# Patient Record
Sex: MALE | Race: WHITE | NOT HISPANIC OR LATINO | Employment: OTHER | URBAN - METROPOLITAN AREA
[De-identification: names, ages, dates, MRNs, and addresses within clinical notes are randomized per-mention and may not be internally consistent; named-entity substitution may affect disease eponyms.]

---

## 2019-03-20 ENCOUNTER — TELEPHONE (OUTPATIENT)
Dept: VASCULAR SURGERY | Facility: CLINIC | Age: 74
End: 2019-03-20

## 2019-03-20 NOTE — TELEPHONE ENCOUNTER
----- Message from Kiran Cast sent at 3/20/2019  4:43 PM CDT -----  Contact: Lisa with [facility]    Advised ptnt needs an appnt as soon as possible for CABG he is being discharged today. Appnt scheduled for 04-09-10.  Please call his family [phone] or [phone]

## 2019-03-25 ENCOUNTER — OFFICE VISIT (OUTPATIENT)
Dept: VASCULAR SURGERY | Facility: CLINIC | Age: 74
End: 2019-03-25
Payer: MEDICARE

## 2019-03-25 VITALS
HEART RATE: 89 BPM | HEIGHT: 72 IN | BODY MASS INDEX: 26.55 KG/M2 | RESPIRATION RATE: 18 BRPM | DIASTOLIC BLOOD PRESSURE: 79 MMHG | WEIGHT: 196 LBS | SYSTOLIC BLOOD PRESSURE: 161 MMHG

## 2019-03-25 DIAGNOSIS — Z95.5 HISTORY OF HEART ARTERY STENT: ICD-10-CM

## 2019-03-25 DIAGNOSIS — I73.9 PERIPHERAL VASCULAR DISEASE WITH CLAUDICATION: ICD-10-CM

## 2019-03-25 DIAGNOSIS — I25.111 CORONARY ARTERY DISEASE INVOLVING NATIVE CORONARY ARTERY OF NATIVE HEART WITH ANGINA PECTORIS WITH DOCUMENTED SPASM: ICD-10-CM

## 2019-03-25 DIAGNOSIS — I20.89 EXERTIONAL ANGINA: ICD-10-CM

## 2019-03-25 PROCEDURE — 99999 PR PBB SHADOW E&M-EST. PATIENT-LVL III: CPT | Mod: PBBFAC,,, | Performed by: THORACIC SURGERY (CARDIOTHORACIC VASCULAR SURGERY)

## 2019-03-25 PROCEDURE — 99999 PR PBB SHADOW E&M-EST. PATIENT-LVL III: ICD-10-PCS | Mod: PBBFAC,,, | Performed by: THORACIC SURGERY (CARDIOTHORACIC VASCULAR SURGERY)

## 2019-03-25 PROCEDURE — 1101F PT FALLS ASSESS-DOCD LE1/YR: CPT | Mod: CPTII,S$GLB,, | Performed by: THORACIC SURGERY (CARDIOTHORACIC VASCULAR SURGERY)

## 2019-03-25 PROCEDURE — 99205 PR OFFICE/OUTPT VISIT, NEW, LEVL V, 60-74 MIN: ICD-10-PCS | Mod: S$GLB,,, | Performed by: THORACIC SURGERY (CARDIOTHORACIC VASCULAR SURGERY)

## 2019-03-25 PROCEDURE — 1101F PR PT FALLS ASSESS DOC 0-1 FALLS W/OUT INJ PAST YR: ICD-10-PCS | Mod: CPTII,S$GLB,, | Performed by: THORACIC SURGERY (CARDIOTHORACIC VASCULAR SURGERY)

## 2019-03-25 PROCEDURE — 99205 OFFICE O/P NEW HI 60 MIN: CPT | Mod: S$GLB,,, | Performed by: THORACIC SURGERY (CARDIOTHORACIC VASCULAR SURGERY)

## 2019-03-25 RX ORDER — PAROXETINE HYDROCHLORIDE 20 MG/1
20 TABLET, FILM COATED ORAL EVERY MORNING
COMMUNITY

## 2019-03-25 RX ORDER — AMLODIPINE BESYLATE 10 MG/1
10 TABLET ORAL DAILY
COMMUNITY
End: 2019-07-09

## 2019-03-25 RX ORDER — CARVEDILOL 12.5 MG/1
12.5 TABLET ORAL 2 TIMES DAILY WITH MEALS
COMMUNITY
End: 2020-09-29

## 2019-03-25 RX ORDER — ALBUTEROL SULFATE 1.25 MG/3ML
1.25 SOLUTION RESPIRATORY (INHALATION) EVERY 6 HOURS PRN
COMMUNITY
End: 2019-07-09

## 2019-03-25 RX ORDER — LOSARTAN POTASSIUM 100 MG/1
100 TABLET ORAL DAILY
COMMUNITY
End: 2019-04-23

## 2019-03-25 NOTE — PROGRESS NOTES
CHIEF COMPLAINT:   Chief Complaint   Patient presents with    Coronary Artery Disease       REFERRING PROVIDER: Arabella Willams MD    Reason for consult:  Evaluation of coronary artery disease    Subjective:     Patient is a 73 y.o. male  With a history of coronary artery stents approximately 7 years ago.  He has begun to develop an indigestion like pain similar to his symptoms prior to his previous stents.  He has been taking nitroglycerin approximately every other day.  His pains occur with exertion.  They do not occur at rest.  He has undergone cardiac catheterization revealing multivessel coronary artery disease.  He quit tobacco use 6 weeks ago.  He does not take statins because all of them so far have caused syncope.     Patient Active Problem List    Diagnosis Date Noted    Coronary artery disease involving native coronary artery of native heart with angina pectoris with documented spasm 03/25/2019    Exertional angina 03/25/2019    Peripheral vascular disease with claudication 03/25/2019    History of heart artery stent 03/25/2019     Past Medical History:   Diagnosis Date    AAA (abdominal aortic aneurysm)     3.7 cm    Carotid artery occlusion     Bilateral carotid stenosis    Coronary artery disease     Coronary artery disease involving native coronary artery of native heart with angina pectoris with documented spasm 3/25/2019    Exertional angina 3/25/2019    History of heart artery stent 3/25/2019    Hypertension     National Institutes of Health (NIH) Stroke Scale sensory score 2, severe to total sensory loss; patient is not aware of being touched in the face, arm, and leg     Left arm    Peripheral vascular disease with claudication       Past Surgical History:   Procedure Laterality Date    CAROTID ARTERY Stent Bilateral     CORONARY ANGIOPLASTY  03/20/2019    CORONARY STENT PLACEMENT  2012      Medication List with Changes/Refills   Current Medications    ALBUTEROL (ACCUNEB) 1.25  MG/3 ML NEBU    Take 1.25 mg by nebulization every 6 (six) hours as needed. Rescue    AMLODIPINE (NORVASC) 10 MG TABLET    Take 10 mg by mouth once daily.    CARVEDILOL (COREG) 12.5 MG TABLET    Take 12.5 mg by mouth 2 (two) times daily with meals.    LOSARTAN (COZAAR) 100 MG TABLET    Take 100 mg by mouth once daily.    PAROXETINE (PAXIL) 20 MG TABLET    Take 20 mg by mouth every morning.     Review of patient's allergies indicates:   Allergen Reactions    Penicillin       Social History     Tobacco Use    Smoking status: Former Smoker     Packs/day: 1.50     Years: 50.00     Pack years: 75.00     Types: Cigarettes     Last attempt to quit: 2019     Years since quittin.1    Smokeless tobacco: Never Used   Substance Use Topics    Alcohol use: Not Currently      FAMILY HISTORY:  Father with coronary artery disease       Review of Systems   general:  No fevers, chills, night sweats, weight changes.    HEENT: No new visual field changes or hoarseness.    Neck:  No complaints.    Respiratory: No shortness of breath, cough, hemoptysis.   Cardiac:  Positive chest pain with minimal exertion.    GI: No constipation or melena.    : No dysuria or frequency.    Musculoskeletal:  No significant arthralgias.    Skin:  No rash.    Neurologic:  Patient has sensory deficits throughout the.    Vascular:  Bilateral lower extremity claudication worse in the left calf than the right .          Objective: physical exam     Vitals:    19 1409   BP: (!) 161/79   Pulse: 89   Resp: 18   Weight: 88.9 kg (196 lb)   Height: 6' (1.829 m)   PainSc: 0-No pain       General: Well-nourished, well-developed man in no obvious distress.    HEENT: Normocephalic, atraumatic.  There is good facial symmetry.    Neck: Trachea is midline.  There are no carotid bruits.    Chest:  No chest wall abnormalities.    Lungs:  Clear bilaterally.  Breath sounds are distant.    Heart:  Regular rate and rhythm.  No rubs or murmurs.    Abdomen: Soft.   No hepatomegaly.  Bowel sounds are normal.    Extremities:  No cyanosis, clubbing, edema.    Vascular:  2+ femoral pulses bilaterally.  Pedal pulses are not palpable.  Radial pulses are palpable.    Skin:  No rash.    Neurologic:  Alert and oriented x4 with no motor deficits.    Data Review:     CARDIAC CATH:   [facility] 03/20/2019:   I have reviewed the study personally.    Left ventricular ejection fraction 60%.    Left main is normal.    Left anterior descending is occluded proximally within the stented area and reconstitutes from right to left collaterals.  A diagonal branch also has been stented previously and fills through right to left collaterals.  Ramus intermedius is without significant disease disease.  There stents in the mid to distal circumflex including the circumflex and a large obtuse marginal branch.  The large, 3rd, obtuse marginal branch has a 90% InStent stenosis.  The continuing circumflex has stenotic disease and then bifurcates into small vessels beyond that.  Right coronary artery without significant disease.          Assessment:     Rahul was seen today for coronary artery disease.    Diagnoses and all orders for this visit:    Coronary artery disease involving native coronary artery of native heart with angina pectoris with documented spasm    Exertional angina    Peripheral vascular disease with claudication    History of heart artery stent        Plan:       I recommend that the patient undergo surgical coronary revascularization.  I have discussed the benefits as well as risk of surgery including death, bleeding, infection, stroke, heart failure, lung failure, kidney failure, need for blood transfusion, arrhythmia is.  He gives informed consent to proceed.

## 2019-04-03 ENCOUNTER — OUTSIDE PLACE OF SERVICE (OUTPATIENT)
Dept: ADMINISTRATIVE | Facility: OTHER | Age: 74
End: 2019-04-03

## 2019-04-03 ENCOUNTER — OUTSIDE PLACE OF SERVICE (OUTPATIENT)
Dept: ADMINISTRATIVE | Facility: OTHER | Age: 74
End: 2019-04-03
Payer: MEDICARE

## 2019-04-03 PROCEDURE — 34714 OPN FEM ART EXPOS CNDT CRTJ: CPT | Mod: RT,,, | Performed by: THORACIC SURGERY (CARDIOTHORACIC VASCULAR SURGERY)

## 2019-04-03 PROCEDURE — 33533 CABG ARTERIAL SINGLE: CPT | Mod: AS,,, | Performed by: NURSE PRACTITIONER

## 2019-04-03 PROCEDURE — 33533 PR CABG, ARTERIAL, SINGLE: ICD-10-PCS | Mod: ,,, | Performed by: THORACIC SURGERY (CARDIOTHORACIC VASCULAR SURGERY)

## 2019-04-03 PROCEDURE — 33533 CABG ARTERIAL SINGLE: CPT | Mod: ,,, | Performed by: THORACIC SURGERY (CARDIOTHORACIC VASCULAR SURGERY)

## 2019-04-03 PROCEDURE — 33508 PR ENDOSCOPY W/VIDEO-ASST VEIN HARVEST,CABG: ICD-10-PCS | Mod: ,,, | Performed by: THORACIC SURGERY (CARDIOTHORACIC VASCULAR SURGERY)

## 2019-04-03 PROCEDURE — 33517 CABG ARTERY-VEIN SINGLE: CPT | Mod: AS,,, | Performed by: NURSE PRACTITIONER

## 2019-04-03 PROCEDURE — 33508 ENDOSCOPIC VEIN HARVEST: CPT | Mod: ,,, | Performed by: THORACIC SURGERY (CARDIOTHORACIC VASCULAR SURGERY)

## 2019-04-03 PROCEDURE — 33517 PR CABG, ARTERY-VEIN, SINGLE: ICD-10-PCS | Mod: AS,,, | Performed by: NURSE PRACTITIONER

## 2019-04-03 PROCEDURE — 34714 OPN FEM ART EXPOS CNDT CRTJ: CPT | Mod: AS,RT,, | Performed by: NURSE PRACTITIONER

## 2019-04-03 PROCEDURE — 34714 PR EXPOSURE, FEM ART, W/CNDT, DLVR ENDOVASC PROSTH, OPEN: ICD-10-PCS | Mod: RT,,, | Performed by: THORACIC SURGERY (CARDIOTHORACIC VASCULAR SURGERY)

## 2019-04-03 PROCEDURE — 33517 PR CABG, ARTERY-VEIN, SINGLE: ICD-10-PCS | Mod: ,,, | Performed by: THORACIC SURGERY (CARDIOTHORACIC VASCULAR SURGERY)

## 2019-04-03 PROCEDURE — 33533 PR CABG, ARTERIAL, SINGLE: ICD-10-PCS | Mod: AS,,, | Performed by: NURSE PRACTITIONER

## 2019-04-03 PROCEDURE — 33517 CABG ARTERY-VEIN SINGLE: CPT | Mod: ,,, | Performed by: THORACIC SURGERY (CARDIOTHORACIC VASCULAR SURGERY)

## 2019-04-03 PROCEDURE — 34714 PR EXPOSURE, FEM ART, W/CNDT, DLVR ENDOVASC PROSTH, OPEN: ICD-10-PCS | Mod: AS,RT,, | Performed by: NURSE PRACTITIONER

## 2019-04-11 ENCOUNTER — TELEPHONE (OUTPATIENT)
Dept: VASCULAR SURGERY | Facility: CLINIC | Age: 74
End: 2019-04-11

## 2019-04-11 NOTE — TELEPHONE ENCOUNTER
----- Message from Eliana Blanco sent at 4/11/2019 12:38 PM CDT -----  Contact: Amanda Osborne (Spouse)  Type: Needs Medical Advice    Who Called:  Amanda Osborne (Spouse)  Best Call Back Number: [phone]  Additional Information: would like for the nurse to give her a call back. She has a few questions

## 2019-04-11 NOTE — TELEPHONE ENCOUNTER
Was calling because he could not get into cardiac rehab until June. I told her she needed to contact Dr Willams to see when he wants him to start. She also refused appointment to see Dr Harris on 4/30 because she is a hairdresser and she works on Tuesday and cannot bring him. I tried to explain to her that he is only in on Tuesday, that he is in surgery the other days. She said he saw us on Monday last time. Yes he did but it was an exception for him to see him that day. I will leave him on the schedule for 4/30 and will await her return call.

## 2019-04-17 ENCOUNTER — TELEPHONE (OUTPATIENT)
Dept: VASCULAR SURGERY | Facility: CLINIC | Age: 74
End: 2019-04-17

## 2019-04-17 NOTE — TELEPHONE ENCOUNTER
Patient wife Amanda was calling to let us know that Rahul is having SOB and no energy. He does not want to walk or do any of his exercises. I let her know that she should contact his cardiologist Dr Willams and let him know. She states she has already called them today and they have not returned her call. They gave him an appointment in May. I called and left a message at Dr Willams's for them to get in touch with her. His follow-up appointment with Dr Harris is on 4/30.

## 2019-04-17 NOTE — TELEPHONE ENCOUNTER
----- Message from Milvia Tucker sent at 4/17/2019  9:23 AM CDT -----  Contact: Pt/wife  Please give pt wife a call at [phone] regarding some concerns she has about the pt

## 2019-04-23 ENCOUNTER — OFFICE VISIT (OUTPATIENT)
Dept: VASCULAR SURGERY | Facility: CLINIC | Age: 74
End: 2019-04-23
Payer: MEDICARE

## 2019-04-23 VITALS
DIASTOLIC BLOOD PRESSURE: 77 MMHG | BODY MASS INDEX: 24.63 KG/M2 | HEART RATE: 108 BPM | HEIGHT: 72 IN | WEIGHT: 181.88 LBS | SYSTOLIC BLOOD PRESSURE: 143 MMHG

## 2019-04-23 DIAGNOSIS — Z95.1 S/P CABG X 2: Primary | ICD-10-CM

## 2019-04-23 DIAGNOSIS — S54.02XA NEURAPRAXIA OF LEFT ULNAR NERVE, INITIAL ENCOUNTER: ICD-10-CM

## 2019-04-23 PROCEDURE — 99024 PR POST-OP FOLLOW-UP VISIT: ICD-10-PCS | Mod: S$GLB,,, | Performed by: THORACIC SURGERY (CARDIOTHORACIC VASCULAR SURGERY)

## 2019-04-23 PROCEDURE — 99999 PR PBB SHADOW E&M-EST. PATIENT-LVL IV: CPT | Mod: PBBFAC,,, | Performed by: THORACIC SURGERY (CARDIOTHORACIC VASCULAR SURGERY)

## 2019-04-23 PROCEDURE — 99024 POSTOP FOLLOW-UP VISIT: CPT | Mod: S$GLB,,, | Performed by: THORACIC SURGERY (CARDIOTHORACIC VASCULAR SURGERY)

## 2019-04-23 PROCEDURE — 99999 PR PBB SHADOW E&M-EST. PATIENT-LVL IV: ICD-10-PCS | Mod: PBBFAC,,, | Performed by: THORACIC SURGERY (CARDIOTHORACIC VASCULAR SURGERY)

## 2019-04-23 RX ORDER — FERROUS GLUCONATE 324(38)MG
TABLET ORAL
Refills: 0 | COMMUNITY
Start: 2019-04-08 | End: 2019-07-09

## 2019-04-23 RX ORDER — ASPIRIN 325 MG
TABLET ORAL
Refills: 3 | COMMUNITY
Start: 2019-04-08 | End: 2020-09-29

## 2019-04-23 NOTE — PROGRESS NOTES
Subjective:       Patient ID: Rahul Osborne is a 73 y.o. male patient of  Dr. Harris who underwent coronary artery bypass × 2 (left internal thoracic artery to left anterior descending, reverse saphenous vein graft from LIMA graft to obtuse marginal) (procedure done as empty beating heart on pump surgery through right femoral and right atrial cannulation) on 4/3/19 at [facility]. He called the office yesterday complaining of redness around the distal part of his incision. He is here today for evaluation. He states that he has had two falls and developed some bruising in the lower part of his chest with redness surrounding the distal third of the incision about 3 days ago. He complains of some tenderness at this site. He saw Dr. Willams yesterday. He was started on Doxycycline 100mg twice daily for treatment of cellutis. He states that he still has some left hand weakness since the surgery. He also complains of occasional dizziness and light headedness.        Objective:     GEN: pleasant white male in no acute distress  HEART: RRR. No murmur  CHEST: Lungs clear in all fields. Sternal incision is well approximated. Ecchymosis across lower thorax at the level of the diaphragm with a large area of dark erythema, which blanches. Mild tenderness on palpation. Sternum is stable.  EXTREMITIES: Left leg: Incision to medial thigh SVG harvest site is well approximated without erythema or drainage. No lower extremity edema. Right groin cannulation site is well healed.                            Left hand:  strength - Function of intrinsic hand muscles are absent. 1+/4+ extension strength at the wrist. Biceps/triceps strength is equal bilaterally. Altered sensation along the ulnar border of the left arm which was present prior to the surgery.      Assessment:       1. S/P CABG x 2  2. Neurapraxia left ulnar nerve    Plan:       1. Continue Doxycycline   2. PT and OT for left hand. Orders to [facility]  3. Consult Neurology for left hand neurapraxia  4.  Return to see  in one week.    * Patient examined by Dr. Harris

## 2019-04-30 ENCOUNTER — OFFICE VISIT (OUTPATIENT)
Dept: VASCULAR SURGERY | Facility: CLINIC | Age: 74
End: 2019-04-30
Payer: MEDICARE

## 2019-04-30 VITALS
BODY MASS INDEX: 24.22 KG/M2 | HEART RATE: 90 BPM | DIASTOLIC BLOOD PRESSURE: 77 MMHG | HEIGHT: 72 IN | SYSTOLIC BLOOD PRESSURE: 126 MMHG | WEIGHT: 178.81 LBS

## 2019-04-30 DIAGNOSIS — Z95.1 S/P CABG (CORONARY ARTERY BYPASS GRAFT): ICD-10-CM

## 2019-04-30 DIAGNOSIS — S54.02XD NEURAPRAXIA OF LEFT ULNAR NERVE, SUBSEQUENT ENCOUNTER: ICD-10-CM

## 2019-04-30 PROBLEM — S54.02XA: Status: ACTIVE | Noted: 2019-04-30

## 2019-04-30 PROCEDURE — 99024 PR POST-OP FOLLOW-UP VISIT: ICD-10-PCS | Mod: S$GLB,,, | Performed by: THORACIC SURGERY (CARDIOTHORACIC VASCULAR SURGERY)

## 2019-04-30 PROCEDURE — 99024 POSTOP FOLLOW-UP VISIT: CPT | Mod: S$GLB,,, | Performed by: THORACIC SURGERY (CARDIOTHORACIC VASCULAR SURGERY)

## 2019-04-30 PROCEDURE — 99999 PR PBB SHADOW E&M-EST. PATIENT-LVL III: ICD-10-PCS | Mod: PBBFAC,,, | Performed by: THORACIC SURGERY (CARDIOTHORACIC VASCULAR SURGERY)

## 2019-04-30 PROCEDURE — 99999 PR PBB SHADOW E&M-EST. PATIENT-LVL III: CPT | Mod: PBBFAC,,, | Performed by: THORACIC SURGERY (CARDIOTHORACIC VASCULAR SURGERY)

## 2019-04-30 RX ORDER — DOXYCYCLINE 100 MG/1
CAPSULE ORAL
Refills: 20 | COMMUNITY
Start: 2019-04-27 | End: 2019-07-09

## 2019-04-30 NOTE — PROGRESS NOTES
Subjective:       Rahul Osborne presents to the clinic after undergoing coronary artery bypass x2 on 04/03/2019 and [facility].  Postoperatively was noted to have weakness of the intrinsic muscles of the left hand.  This still has not improved.  He was scheduled for occupational therapy, but he declined initial visit by home health occupational therapy.  He was also schedule for neurology evaluation, but his wife was not able to make the appointment on short notice.  He remains with left hand weakness.  He is also developed episodes of dizziness to the point of near syncope which usually occur with ambulation.       Objective:      /77   Pulse 90   Ht 6' (1.829 m)   Wt 81.1 kg (178 lb 12.8 oz)   BMI 24.25 kg/m²     Objective  General:  He appears well.  Chest:  Sternum is healed.  Sternotomy incisions are healing well.  The purple/red area around the lowest part of his incision is resolving.  Heart:  Regular rate and rhythm.  Lungs:  Clear bilaterally       Assessment:      Postoperative course complicated by neurapraxia in the left ulnar nerve distribution distally and now by presyncope.  Patient also does note that he has had some other falls earlier 1 of which caused him to strike his chest.        Plan:      1. Continue any current medications.  2. Wound care discussed.  3.  He is encouraged to reschedule his neurology appointment and to contact the home occupational therapist to immediately initiate occupational therapy for his left hand/arm.  4. Follow up: 4 weeks

## 2019-05-01 ENCOUNTER — TELEPHONE (OUTPATIENT)
Dept: VASCULAR SURGERY | Facility: CLINIC | Age: 74
End: 2019-05-01

## 2019-05-01 NOTE — TELEPHONE ENCOUNTER
Called Zak.Dr Willams to get the patient scheduled for Tilt Table Test since [facility] does not do them. Patient has to be sent to Dr Gaming @ [facility]

## 2019-05-03 ENCOUNTER — TELEPHONE (OUTPATIENT)
Dept: VASCULAR SURGERY | Facility: CLINIC | Age: 74
End: 2019-05-03

## 2019-05-03 NOTE — TELEPHONE ENCOUNTER
Debbie  nurse was calling to say that patients BP was 107/71 and 83/49. She was asked to contact patients cardiologist Dr Willams. His nurse Zak was to contact patient with appointment for a tilt table test, but patient states this has not been scheduled yet. She will contact his off with the BP concerns and the tilt table test

## 2019-05-03 NOTE — TELEPHONE ENCOUNTER
----- Message from Elian Soto sent at 5/3/2019  1:01 PM CDT -----  Type: Needs Medical Advice    Who Called:  Debbie Regan/[employer]  Best Call Back Number: [phone]  Additional Information: Patient seems to be getting orthostatic hypertension with any activity

## 2019-05-17 ENCOUNTER — TELEPHONE (OUTPATIENT)
Dept: VASCULAR SURGERY | Facility: CLINIC | Age: 74
End: 2019-05-17

## 2019-05-17 NOTE — TELEPHONE ENCOUNTER
----- Message from Ada Ferguson sent at 5/17/2019  2:48 PM CDT -----  Contact: Willy Vaz-  [facility]        [phone]  Would like to have orders for outpatient occupational therapy. Please call back at [phone].  Md Sumi

## 2019-05-28 ENCOUNTER — OFFICE VISIT (OUTPATIENT)
Dept: VASCULAR SURGERY | Facility: CLINIC | Age: 74
End: 2019-05-28
Payer: MEDICARE

## 2019-05-28 VITALS
HEIGHT: 72 IN | RESPIRATION RATE: 20 BRPM | SYSTOLIC BLOOD PRESSURE: 162 MMHG | HEART RATE: 112 BPM | BODY MASS INDEX: 24.24 KG/M2 | WEIGHT: 179 LBS | DIASTOLIC BLOOD PRESSURE: 102 MMHG

## 2019-05-28 DIAGNOSIS — S54.02XD NEURAPRAXIA OF LEFT ULNAR NERVE, SUBSEQUENT ENCOUNTER: ICD-10-CM

## 2019-05-28 DIAGNOSIS — Z95.1 S/P CABG (CORONARY ARTERY BYPASS GRAFT): Primary | ICD-10-CM

## 2019-05-28 PROCEDURE — 99999 PR PBB SHADOW E&M-EST. PATIENT-LVL III: CPT | Mod: PBBFAC,,, | Performed by: THORACIC SURGERY (CARDIOTHORACIC VASCULAR SURGERY)

## 2019-05-28 PROCEDURE — 99024 PR POST-OP FOLLOW-UP VISIT: ICD-10-PCS | Mod: S$GLB,,, | Performed by: THORACIC SURGERY (CARDIOTHORACIC VASCULAR SURGERY)

## 2019-05-28 PROCEDURE — 99999 PR PBB SHADOW E&M-EST. PATIENT-LVL III: ICD-10-PCS | Mod: PBBFAC,,, | Performed by: THORACIC SURGERY (CARDIOTHORACIC VASCULAR SURGERY)

## 2019-05-28 PROCEDURE — 99024 POSTOP FOLLOW-UP VISIT: CPT | Mod: S$GLB,,, | Performed by: THORACIC SURGERY (CARDIOTHORACIC VASCULAR SURGERY)

## 2019-05-28 NOTE — PROGRESS NOTES
Subjective:       Rahul Osborne presents to the clinic  After undergoing coronary artery bypass x2 on 04/03/2019 at [facility].  He continues with intrinsic muscle weakness in the left hand.  He has only had 1 treatment through Occupational therapy who he states came to his home but did not follow up.  He has started with physical therapy.  He has noted occasional slight popping at the very lowest right edge of his sternotomy incision.       Objective:      BP (!) 162/102   Pulse (!) 112   Resp 20   Ht 6' (1.829 m)   Wt 81.2 kg (179 lb)   BMI 24.28 kg/m²     Objective    General:  He appears well.    Chest:  Sternum is completely stable through all maneuvers that I perform upon it.  There is still some slight erythema and skin thickening in the low as portion around his sternotomy incision but the area has markedly decreased in circumference and shows no sign of active infection.    Lungs:  Clear bilaterally.    Heart: Regular rate and rhythm.    Neurologic:  Remains with intrinsic muscle weakness in the left hand       Assessment:      Doing well postoperatively from a surgical an incisional standpoint.   remains with intrinsic muscle weakness in left hand       Plan:      1. Continue any current medications.  2.  Continue occupational therapy.  Patient will now proceed through a different company other than [facility].  3. Pt is to increase activities as tolerated.  4. Follow up: 6 weeks

## 2019-05-29 ENCOUNTER — TELEPHONE (OUTPATIENT)
Dept: VASCULAR SURGERY | Facility: CLINIC | Age: 74
End: 2019-05-29

## 2019-05-29 NOTE — TELEPHONE ENCOUNTER
----- Message from Darlyn Morin sent at 5/29/2019  1:13 PM CDT -----  Please call Miss Hernandes ...[phone] about the physical therapy orders

## 2019-05-29 NOTE — TELEPHONE ENCOUNTER
----- Message from Darlyn Morin sent at 5/29/2019 12:32 PM CDT -----  Type: Needs Medical Advice    Who Called:   Noelle / [facility] [phone] fax  [phone]   Symptoms (please be specific):  Request a phys therapy for generalized  weakness   How long has patient had these symptoms:  They did receive a new order but not was correct   Pharmacy name and phone #:     Best Call Back Number:    Additional Information:

## 2019-05-29 NOTE — TELEPHONE ENCOUNTER
----- Message from Elian Soto sent at 5/29/2019  8:20 AM CDT -----  Type: Needs Medical Advice    Who Called:  [facility]  Best Call Back Number: [phone]  Additional Information: Need order for Physical Therapy  For strengthening - please fax to [phone]

## 2019-05-30 NOTE — TELEPHONE ENCOUNTER
I spoke to Lauren in Dr Willams's office and she will contact patient to get enrolled in cardiac rehab. Greta notified

## 2019-06-20 ENCOUNTER — TELEPHONE (OUTPATIENT)
Dept: VASCULAR SURGERY | Facility: CLINIC | Age: 74
End: 2019-06-20

## 2019-06-20 NOTE — TELEPHONE ENCOUNTER
----- Message from Chayo Castillo sent at 6/20/2019  1:12 PM CDT -----  Contact: Patient  Type: Needs Medical Advice    Who Called:  Patient  Symptoms (please be specific): surgical clamps above left hip/ painful/ red and white blood cells and platelets are extremely low  Best Call Back Number:   Additional Information: Calling to speak with the Nurse about the symptoms. Please advise

## 2019-06-20 NOTE — TELEPHONE ENCOUNTER
----- Message from Mei Kraus sent at 6/20/2019  3:10 PM CDT -----  Type:  Patient Returning Call    Who Called:  Patient  Who Left Message for Patient:  n/a  Does the patient know what this is regarding?:  no  Best Call Back Number:  [phone] (home)

## 2019-07-03 NOTE — TELEPHONE ENCOUNTER
Patient did not pursue getting hip xray report faxed to us. He will bring disc and report to appointment on 7/9/19. Our new address was given also

## 2019-07-09 ENCOUNTER — OFFICE VISIT (OUTPATIENT)
Dept: CARDIAC SURGERY | Facility: CLINIC | Age: 74
End: 2019-07-09
Payer: MEDICARE

## 2019-07-09 VITALS
HEART RATE: 123 BPM | WEIGHT: 180.63 LBS | DIASTOLIC BLOOD PRESSURE: 81 MMHG | BODY MASS INDEX: 24.46 KG/M2 | SYSTOLIC BLOOD PRESSURE: 147 MMHG | HEIGHT: 72 IN

## 2019-07-09 DIAGNOSIS — Z95.1 S/P CABG (CORONARY ARTERY BYPASS GRAFT): Primary | ICD-10-CM

## 2019-07-09 PROCEDURE — 99999 PR PBB SHADOW E&M-EST. PATIENT-LVL III: ICD-10-PCS | Mod: PBBFAC,,, | Performed by: THORACIC SURGERY (CARDIOTHORACIC VASCULAR SURGERY)

## 2019-07-09 PROCEDURE — 99024 PR POST-OP FOLLOW-UP VISIT: ICD-10-PCS | Mod: S$GLB,,, | Performed by: THORACIC SURGERY (CARDIOTHORACIC VASCULAR SURGERY)

## 2019-07-09 PROCEDURE — 99999 PR PBB SHADOW E&M-EST. PATIENT-LVL III: CPT | Mod: PBBFAC,,, | Performed by: THORACIC SURGERY (CARDIOTHORACIC VASCULAR SURGERY)

## 2019-07-09 PROCEDURE — 99024 POSTOP FOLLOW-UP VISIT: CPT | Mod: S$GLB,,, | Performed by: THORACIC SURGERY (CARDIOTHORACIC VASCULAR SURGERY)

## 2019-07-09 RX ORDER — HYDROCODONE BITARTRATE AND ACETAMINOPHEN 5; 325 MG/1; MG/1
TABLET ORAL
Refills: 0 | COMMUNITY
Start: 2019-06-20 | End: 2020-09-29

## 2019-07-09 NOTE — PROGRESS NOTES
Subjective:       Rahul Osborne presents to the clinic after undergoing a coronary artery bypass x2 on 04/03/2019 at [facility].  The surgery was done through right femoral arterial cannulation secondary to significant atherosclerotic disease of the ascending aorta.  The left femoral artery was exposed, but it was too calcified to allow cannulation.  He has had some left hip complaints mostly radiating from his left buttock around the side of his leg and not associated with the left groin.  He has also noted steady tachycardia.  Cardiology has returned him to Coreg 6.25 mg daily yesterday for treatment of this.       Objective:      BP (!) 147/81   Pulse (!) 123   Ht 6' (1.829 m)   Wt 81.9 kg (180 lb 9.6 oz)   BMI 24.49 kg/m²     Objective  General:  He appears well.  Chest:  Sternum is stable.  Sternotomy incisions are healed.  Lungs:  Clear bilaterally.  Heart:  Tachycardia, regular rhythm. No rubs or murmurs.  Incisions:  Chest incisions are healed.  Bilateral groin incisions are healed.  There soft and flat with no abnormal pulsatility and no erythema.  A left lower thigh incision is healed.       Assessment:      Doing well postoperatively.      Plan:      1. Continue any current medications under direction of cardiology.  2.  Patient will call Dr. Willams and me next week with blood pressure and heart rate readings in order to help adjust his medications.  3. Pt is to increase activities as tolerated.  4. Follow up:  As needed

## 2019-08-01 ENCOUNTER — TELEPHONE (OUTPATIENT)
Dept: VASCULAR SURGERY | Facility: CLINIC | Age: 74
End: 2019-08-01

## 2019-08-01 NOTE — TELEPHONE ENCOUNTER
Patient called to let us know that he has left voice mails for Dr Willams's office to return his call due to dizziness and elevated heart rate of 116-120. I have called and spoke to Lauren in Dr Willams's office and let her know of his concerns. She assured me that she would be contacting him. I informed his of this and encouraged him to call them back if he does not hear from her within an hour. He will call me back when he gets results of bone marrow and his heart ultrasound that was done this week

## 2019-08-01 NOTE — TELEPHONE ENCOUNTER
----- Message from Peri Vick sent at 8/1/2019  9:54 AM CDT -----  Contact: Patient  Patient needs top speak to nurse about some information he has, please call him back at [phone]. Thank you

## 2020-08-28 ENCOUNTER — TELEPHONE (OUTPATIENT)
Dept: VASCULAR SURGERY | Facility: CLINIC | Age: 75
End: 2020-08-28

## 2020-08-28 NOTE — TELEPHONE ENCOUNTER
----- Message from Lisbet Luna LPN sent at 8/27/2020  2:19 PM CDT -----  Regarding: FW: Appointment  Contact: Patient @ [phone]    ----- Message -----  From: Viola Jackson  Sent: 8/27/2020   1:01 PM CDT  To: Andrei [facility]A Staff  Subject: Appointment                                      Good Afternoon  Patient would like to make an appointment due to his abdominal aortic aneurysm    Please call and advise

## 2020-09-29 ENCOUNTER — OFFICE VISIT (OUTPATIENT)
Dept: CARDIAC SURGERY | Facility: CLINIC | Age: 75
End: 2020-09-29
Payer: MEDICARE

## 2020-09-29 VITALS
TEMPERATURE: 98 F | HEIGHT: 72 IN | SYSTOLIC BLOOD PRESSURE: 155 MMHG | HEART RATE: 119 BPM | BODY MASS INDEX: 23.92 KG/M2 | DIASTOLIC BLOOD PRESSURE: 88 MMHG | WEIGHT: 176.63 LBS

## 2020-09-29 DIAGNOSIS — I73.9 PERIPHERAL VASCULAR DISEASE OF EXTREMITY WITH CLAUDICATION: ICD-10-CM

## 2020-09-29 DIAGNOSIS — I71.40 ABDOMINAL AORTIC ANEURYSM WITHOUT RUPTURE: ICD-10-CM

## 2020-09-29 DIAGNOSIS — I70.0 ATHEROSCLEROSIS OF ABDOMINAL AORTA: ICD-10-CM

## 2020-09-29 PROCEDURE — 99214 OFFICE O/P EST MOD 30 MIN: CPT | Mod: S$GLB,,, | Performed by: THORACIC SURGERY (CARDIOTHORACIC VASCULAR SURGERY)

## 2020-09-29 PROCEDURE — 99214 PR OFFICE/OUTPT VISIT, EST, LEVL IV, 30-39 MIN: ICD-10-PCS | Mod: S$GLB,,, | Performed by: THORACIC SURGERY (CARDIOTHORACIC VASCULAR SURGERY)

## 2020-09-29 PROCEDURE — 1101F PR PT FALLS ASSESS DOC 0-1 FALLS W/OUT INJ PAST YR: ICD-10-PCS | Mod: CPTII,S$GLB,, | Performed by: THORACIC SURGERY (CARDIOTHORACIC VASCULAR SURGERY)

## 2020-09-29 PROCEDURE — 3008F PR BODY MASS INDEX (BMI) DOCUMENTED: ICD-10-PCS | Mod: CPTII,S$GLB,, | Performed by: THORACIC SURGERY (CARDIOTHORACIC VASCULAR SURGERY)

## 2020-09-29 PROCEDURE — 99999 PR PBB SHADOW E&M-EST. PATIENT-LVL III: ICD-10-PCS | Mod: PBBFAC,,, | Performed by: THORACIC SURGERY (CARDIOTHORACIC VASCULAR SURGERY)

## 2020-09-29 PROCEDURE — 3008F BODY MASS INDEX DOCD: CPT | Mod: CPTII,S$GLB,, | Performed by: THORACIC SURGERY (CARDIOTHORACIC VASCULAR SURGERY)

## 2020-09-29 PROCEDURE — 1126F AMNT PAIN NOTED NONE PRSNT: CPT | Mod: S$GLB,,, | Performed by: THORACIC SURGERY (CARDIOTHORACIC VASCULAR SURGERY)

## 2020-09-29 PROCEDURE — 1159F MED LIST DOCD IN RCRD: CPT | Mod: S$GLB,,, | Performed by: THORACIC SURGERY (CARDIOTHORACIC VASCULAR SURGERY)

## 2020-09-29 PROCEDURE — 1126F PR PAIN SEVERITY QUANTIFIED, NO PAIN PRESENT: ICD-10-PCS | Mod: S$GLB,,, | Performed by: THORACIC SURGERY (CARDIOTHORACIC VASCULAR SURGERY)

## 2020-09-29 PROCEDURE — 1101F PT FALLS ASSESS-DOCD LE1/YR: CPT | Mod: CPTII,S$GLB,, | Performed by: THORACIC SURGERY (CARDIOTHORACIC VASCULAR SURGERY)

## 2020-09-29 PROCEDURE — 99999 PR PBB SHADOW E&M-EST. PATIENT-LVL III: CPT | Mod: PBBFAC,,, | Performed by: THORACIC SURGERY (CARDIOTHORACIC VASCULAR SURGERY)

## 2020-09-29 PROCEDURE — 1159F PR MEDICATION LIST DOCUMENTED IN MEDICAL RECORD: ICD-10-PCS | Mod: S$GLB,,, | Performed by: THORACIC SURGERY (CARDIOTHORACIC VASCULAR SURGERY)

## 2020-09-29 RX ORDER — HYDRALAZINE HYDROCHLORIDE 50 MG/1
25 TABLET, FILM COATED ORAL
COMMUNITY
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
## 2022-01-13 ENCOUNTER — TELEPHONE (OUTPATIENT)
Dept: VASCULAR SURGERY | Facility: CLINIC | Age: 77
End: 2022-01-13
Payer: MEDICARE

## 2022-01-13 NOTE — TELEPHONE ENCOUNTER
Letter mailed 21 to f/u abdominal .  Patient passed away on 21  [facility]  A resident of [place], [place], passed away on 2021 at his home. He was born November 10, 1945 in [place], [place] and was 76 years of age. He was a [employer] and a retired . He is survived by his wife, Amanda Osborne; daughter, Merle Callahan and her , Nicholas; grandchildren, Darren Callahan and Good Callahan; sister, Lisa Garcia and her , Guilherme. Preceded in death by his father, Sergio Osborne; mother, Cindy Osborne; 3 sisters, Johana Cabello, Aidan Dalton and Bria Riggins. No services are scheduled. [facility], [place], in charge of arrangements. For an online guestbook, visit [email or web address].